# Patient Record
Sex: FEMALE | Race: WHITE | NOT HISPANIC OR LATINO | Employment: UNEMPLOYED | URBAN - METROPOLITAN AREA
[De-identification: names, ages, dates, MRNs, and addresses within clinical notes are randomized per-mention and may not be internally consistent; named-entity substitution may affect disease eponyms.]

---

## 2024-06-05 ENCOUNTER — HOSPITAL ENCOUNTER (OUTPATIENT)
Facility: HOSPITAL | Age: 34
Discharge: HOME | End: 2024-06-05
Attending: OBSTETRICS & GYNECOLOGY | Admitting: OBSTETRICS & GYNECOLOGY
Payer: COMMERCIAL

## 2024-06-05 ENCOUNTER — HOSPITAL ENCOUNTER (OUTPATIENT)
Facility: HOSPITAL | Age: 34
Setting detail: OBSERVATION
End: 2024-06-05
Attending: OBSTETRICS & GYNECOLOGY | Admitting: OBSTETRICS & GYNECOLOGY
Payer: COMMERCIAL

## 2024-06-05 ENCOUNTER — HOSPITAL ENCOUNTER (EMERGENCY)
Facility: HOSPITAL | Age: 34
Discharge: ANOTHER HOSPITAL | End: 2024-06-05
Attending: EMERGENCY MEDICINE
Payer: COMMERCIAL

## 2024-06-05 ENCOUNTER — HOSPITAL ENCOUNTER (OUTPATIENT)
Facility: HOSPITAL | Age: 34
End: 2024-06-05
Attending: OBSTETRICS & GYNECOLOGY | Admitting: OBSTETRICS & GYNECOLOGY
Payer: COMMERCIAL

## 2024-06-05 VITALS
OXYGEN SATURATION: 98 % | WEIGHT: 127 LBS | DIASTOLIC BLOOD PRESSURE: 74 MMHG | SYSTOLIC BLOOD PRESSURE: 109 MMHG | HEIGHT: 63 IN | RESPIRATION RATE: 17 BRPM | HEART RATE: 89 BPM | TEMPERATURE: 96.6 F | BODY MASS INDEX: 22.5 KG/M2

## 2024-06-05 VITALS
RESPIRATION RATE: 20 BRPM | WEIGHT: 132.72 LBS | OXYGEN SATURATION: 100 % | HEART RATE: 92 BPM | SYSTOLIC BLOOD PRESSURE: 113 MMHG | TEMPERATURE: 97.6 F | BODY MASS INDEX: 23.52 KG/M2 | HEIGHT: 63 IN | DIASTOLIC BLOOD PRESSURE: 73 MMHG

## 2024-06-05 VITALS
OXYGEN SATURATION: 100 % | DIASTOLIC BLOOD PRESSURE: 72 MMHG | SYSTOLIC BLOOD PRESSURE: 127 MMHG | HEIGHT: 63 IN | WEIGHT: 132.72 LBS | TEMPERATURE: 98.2 F | BODY MASS INDEX: 23.52 KG/M2 | RESPIRATION RATE: 20 BRPM | HEART RATE: 89 BPM

## 2024-06-05 DIAGNOSIS — O47.03: Primary | ICD-10-CM

## 2024-06-05 LAB
ABO GROUP (TYPE) IN BLOOD: NORMAL
ALBUMIN SERPL BCP-MCNC: 3.4 G/DL (ref 3.4–5)
ALP SERPL-CCNC: 105 U/L (ref 33–110)
ALT SERPL W P-5'-P-CCNC: 10 U/L (ref 7–45)
ANION GAP SERPL CALC-SCNC: 11 MMOL/L (ref 10–20)
ANTIBODY SCREEN: NORMAL
APPEARANCE UR: CLEAR
AST SERPL W P-5'-P-CCNC: 16 U/L (ref 9–39)
BASOPHILS # BLD AUTO: 0.01 X10*3/UL (ref 0–0.1)
BASOPHILS NFR BLD AUTO: 0.1 %
BILIRUB SERPL-MCNC: 0.4 MG/DL (ref 0–1.2)
BILIRUB UR STRIP.AUTO-MCNC: NEGATIVE MG/DL
BUN SERPL-MCNC: 7 MG/DL (ref 6–23)
CALCIUM SERPL-MCNC: 8.5 MG/DL (ref 8.6–10.3)
CHLORIDE SERPL-SCNC: 108 MMOL/L (ref 98–107)
CO2 SERPL-SCNC: 22 MMOL/L (ref 21–32)
COLOR UR: ABNORMAL
CREAT SERPL-MCNC: 0.6 MG/DL (ref 0.5–1.05)
EGFRCR SERPLBLD CKD-EPI 2021: >90 ML/MIN/1.73M*2
EOSINOPHIL # BLD AUTO: 0.04 X10*3/UL (ref 0–0.7)
EOSINOPHIL NFR BLD AUTO: 0.4 %
ERYTHROCYTE [DISTWIDTH] IN BLOOD BY AUTOMATED COUNT: 13.3 % (ref 11.5–14.5)
GLUCOSE SERPL-MCNC: 131 MG/DL (ref 74–99)
GLUCOSE UR STRIP.AUTO-MCNC: ABNORMAL MG/DL
HCT VFR BLD AUTO: 30.6 % (ref 36–46)
HGB BLD-MCNC: 9.5 G/DL (ref 12–16)
HOLD SPECIMEN: NORMAL
IMM GRANULOCYTES # BLD AUTO: 0.03 X10*3/UL (ref 0–0.7)
IMM GRANULOCYTES NFR BLD AUTO: 0.3 % (ref 0–0.9)
KETONES UR STRIP.AUTO-MCNC: NEGATIVE MG/DL
LEUKOCYTE ESTERASE UR QL STRIP.AUTO: NEGATIVE
LYMPHOCYTES # BLD AUTO: 0.86 X10*3/UL (ref 1.2–4.8)
LYMPHOCYTES NFR BLD AUTO: 9.2 %
MAGNESIUM SERPL-MCNC: 1.79 MG/DL (ref 1.6–2.4)
MCH RBC QN AUTO: 26 PG (ref 26–34)
MCHC RBC AUTO-ENTMCNC: 31 G/DL (ref 32–36)
MCV RBC AUTO: 84 FL (ref 80–100)
MONOCYTES # BLD AUTO: 0.44 X10*3/UL (ref 0.1–1)
MONOCYTES NFR BLD AUTO: 4.7 %
NEUTROPHILS # BLD AUTO: 7.94 X10*3/UL (ref 1.2–7.7)
NEUTROPHILS NFR BLD AUTO: 85.3 %
NITRITE UR QL STRIP.AUTO: NEGATIVE
NRBC BLD-RTO: 0 /100 WBCS (ref 0–0)
PH UR STRIP.AUTO: 7 [PH]
PLATELET # BLD AUTO: 159 X10*3/UL (ref 150–450)
POTASSIUM SERPL-SCNC: 3.6 MMOL/L (ref 3.5–5.3)
PROT SERPL-MCNC: 6.2 G/DL (ref 6.4–8.2)
PROT UR STRIP.AUTO-MCNC: ABNORMAL MG/DL
RBC # BLD AUTO: 3.65 X10*6/UL (ref 4–5.2)
RBC # UR STRIP.AUTO: NEGATIVE /UL
RBC #/AREA URNS AUTO: NORMAL /HPF
RH FACTOR (ANTIGEN D): NORMAL
SODIUM SERPL-SCNC: 137 MMOL/L (ref 136–145)
SP GR UR STRIP.AUTO: 1.02
SQUAMOUS #/AREA URNS AUTO: NORMAL /HPF
UROBILINOGEN UR STRIP.AUTO-MCNC: NORMAL MG/DL
WBC # BLD AUTO: 9.3 X10*3/UL (ref 4.4–11.3)
WBC #/AREA URNS AUTO: NORMAL /HPF

## 2024-06-05 PROCEDURE — 84075 ASSAY ALKALINE PHOSPHATASE: CPT | Performed by: EMERGENCY MEDICINE

## 2024-06-05 PROCEDURE — 85025 COMPLETE CBC W/AUTO DIFF WBC: CPT | Performed by: EMERGENCY MEDICINE

## 2024-06-05 PROCEDURE — 99214 OFFICE O/P EST MOD 30 MIN: CPT

## 2024-06-05 PROCEDURE — 96360 HYDRATION IV INFUSION INIT: CPT

## 2024-06-05 PROCEDURE — 59025 FETAL NON-STRESS TEST: CPT | Performed by: OBSTETRICS & GYNECOLOGY

## 2024-06-05 PROCEDURE — 2500000004 HC RX 250 GENERAL PHARMACY W/ HCPCS (ALT 636 FOR OP/ED): Mod: SE | Performed by: EMERGENCY MEDICINE

## 2024-06-05 PROCEDURE — 36415 COLL VENOUS BLD VENIPUNCTURE: CPT | Performed by: EMERGENCY MEDICINE

## 2024-06-05 PROCEDURE — 99203 OFFICE O/P NEW LOW 30 MIN: CPT | Performed by: OBSTETRICS & GYNECOLOGY

## 2024-06-05 PROCEDURE — 99285 EMERGENCY DEPT VISIT HI MDM: CPT | Mod: 25

## 2024-06-05 PROCEDURE — 83735 ASSAY OF MAGNESIUM: CPT | Performed by: EMERGENCY MEDICINE

## 2024-06-05 PROCEDURE — 81001 URINALYSIS AUTO W/SCOPE: CPT | Performed by: EMERGENCY MEDICINE

## 2024-06-05 PROCEDURE — 86901 BLOOD TYPING SEROLOGIC RH(D): CPT | Performed by: EMERGENCY MEDICINE

## 2024-06-05 RX ORDER — PROGESTERONE 100 MG/1
CAPSULE ORAL DAILY
Status: ON HOLD | COMMUNITY
End: 2024-06-06

## 2024-06-05 RX ADMIN — SODIUM CHLORIDE 1000 ML: 9 INJECTION, SOLUTION INTRAVENOUS at 11:09

## 2024-06-05 ASSESSMENT — PAIN DESCRIPTION - ONSET: ONSET: AWAKENED FROM SLEEP

## 2024-06-05 ASSESSMENT — PAIN DESCRIPTION - FREQUENCY: FREQUENCY: CONSTANT/CONTINUOUS

## 2024-06-05 ASSESSMENT — PAIN SCALES - GENERAL
PAINLEVEL_OUTOF10: 7
PAINLEVEL_OUTOF10: 8
PAINLEVEL_OUTOF10: 6

## 2024-06-05 ASSESSMENT — PAIN DESCRIPTION - ORIENTATION: ORIENTATION: LOWER

## 2024-06-05 ASSESSMENT — PAIN DESCRIPTION - DESCRIPTORS: DESCRIPTORS: PRESSURE

## 2024-06-05 ASSESSMENT — PAIN DESCRIPTION - LOCATION: LOCATION: PELVIS

## 2024-06-05 ASSESSMENT — PAIN DESCRIPTION - PROGRESSION: CLINICAL_PROGRESSION: NOT CHANGED

## 2024-06-05 ASSESSMENT — COLUMBIA-SUICIDE SEVERITY RATING SCALE - C-SSRS
2. HAVE YOU ACTUALLY HAD ANY THOUGHTS OF KILLING YOURSELF?: NO
1. IN THE PAST MONTH, HAVE YOU WISHED YOU WERE DEAD OR WISHED YOU COULD GO TO SLEEP AND NOT WAKE UP?: NO
6. HAVE YOU EVER DONE ANYTHING, STARTED TO DO ANYTHING, OR PREPARED TO DO ANYTHING TO END YOUR LIFE?: NO

## 2024-06-05 ASSESSMENT — PAIN - FUNCTIONAL ASSESSMENT: PAIN_FUNCTIONAL_ASSESSMENT: 0-10

## 2024-06-05 ASSESSMENT — PAIN DESCRIPTION - PAIN TYPE: TYPE: ACUTE PAIN

## 2024-06-05 NOTE — DISCHARGE INSTRUCTIONS
Triage discharge instructions:      []Notify provider for contractions or cramps become more frequent than 3-4 times in an hour  []Notify provider for regular painful contractions every 5 minutes or less for one hour  []Notify provider for leaking of fluid or blood from your vagina  []Notify provider if your baby is not moving as much as usual

## 2024-06-05 NOTE — ED PROVIDER NOTES
HPI   Chief Complaint   Patient presents with    Contractions       HPI  Patient is a 34-year-old female, G8, P7, approximately 35 weeks pregnant, presenting to the ED today for contractions.  Patient currently follows with Dr. Edgar OB.  She explains that she was at [facility] this morning for contractions where they monitored her for about 1 hour and discharged her home.  Later in the day, she started having more frequent contractions so she went to Dr. Edgar's office.  From Dr. Lora's office, she was supposed to be sent to [facility] OB for further monitoring, but somehow ended up here at [facility] instead.  Patient arrived via EMS.  At this time, she currently complains of continued contractions, occurring about every 5 minutes.  She denies any large gush of fluid or vaginal discharge.  She notes good fetal movement. She has no additional concerns.  Denies any complications with her current pregnancy.  Previous ultrasounds have shown baby to be head down. All previous deliveries have been vaginal, sometime between 34 and 37 weeks.                  [place] Coma Scale Score: 15                     Patient History   No past medical history on file.  No past surgical history on file.  No family history on file.  Social History     Tobacco Use    Smoking status: Not on file    Smokeless tobacco: Not on file   Substance Use Topics    Alcohol use: Not on file    Drug use: Not on file       Physical Exam   ED Triage Vitals [06/05/24 1101]   Temperature Heart Rate Respirations BP   36.4 °C (97.6 °F) 99 18 113/73      Pulse Ox Temp Source Heart Rate Source Patient Position   97 % Temporal Monitor --      BP Location FiO2 (%)     -- --       Physical Exam  Vitals and nursing note reviewed.   Constitutional:       General: She is not in acute distress.     Appearance: She is not toxic-appearing.   HENT:      Head: Normocephalic.      Mouth/Throat:      Mouth: Mucous membranes are moist.   Eyes:      Extraocular Movements:  Extraocular movements intact.      Conjunctiva/sclera: Conjunctivae normal.   Cardiovascular:      Rate and Rhythm: Normal rate and regular rhythm.   Pulmonary:      Effort: Pulmonary effort is normal. No respiratory distress.      Breath sounds: Normal breath sounds. No wheezing.   Abdominal:      Comments: Gravid uterus   Genitourinary:     Comments: Performed with MARIELA Rodriguez. Cervix approximately 2 cm dilated, 40% effaced  Musculoskeletal:         General: No swelling.      Cervical back: Neck supple.   Skin:     General: Skin is warm and dry.      Capillary Refill: Capillary refill takes less than 2 seconds.   Neurological:      General: No focal deficit present.      Mental Status: She is alert. Mental status is at baseline.         ED Course & MDM   Diagnoses as of 06/05/24 1226   Premature uterine contractions in third trimester, antepartum ([facility])       Medical Decision Making  Patient was seen and evaluated for contractions in the setting of third trimester pregnancy.  Fetal heart tones were obtained, measuring 125 - 135.  On exam, patient's cervix is approximately 2 cm dilated.  Discussed the case with Dr. Edgar, patient's OB.  He agrees that patient was supposed to be sent to [facility] OB triage for further monitoring, and accepts patient for transfer.  Patient is transferred to [facility] for further management.    Procedure  Procedures        Cristin YOUNG MD  06/05/24 1226

## 2024-06-05 NOTE — H&P
Obstetrical Admission History and Physical     This patient is a 33 yo  with complaints of contractions.  She denies any VB or LOF and has good FM.     Obstetrical History   OB History    Para Term  AB Living   8 7 3 4   7   SAB IAB Ectopic Multiple Live Births           7      # Outcome Date GA Lbr Jayden/2nd Weight Sex Delivery Anes PTL Lv   8 Current            7 Term 21 37w0d   F Vag-Spont None  MERLY   6  19 34w0d   M Vag-Spont EPI  MERLY      Complications: Abruptio Placenta   5  18 36w0d   M Vag-Spont None  MERLY   4 Term 16 37w0d   M Vag-Spont EPI  MERLY      Birth Comments: kept and induced per patient for decreased fetal movement   3 Term 13 37w0d   M Vag-Spont None  MERLY   2  11    F Vag-Spont None Y MERLY      Birth Comments: went into labor at 5 months, stopped and observed, delivered between 35-36 weeks   1  02/22/10 36w0d   F Vag-Spont EPI  MERLY       Past Medical History  No past medical history on file.     Past Surgical History   No past surgical history on file.    Social History  Social History     Tobacco Use    Smoking status: Not on file    Smokeless tobacco: Not on file   Substance Use Topics    Alcohol use: Not on file     Substance and Sexual Activity   Drug Use Not on file       Allergies  Patient has no known allergies.     Medications  Medications Prior to Admission   Medication Sig Dispense Refill Last Dose    progesterone (Prometrium) 100 mg capsule Insert into the vagina once daily.          Objective    Last Vitals  Temp Pulse Resp BP MAP O2 Sat   (!) 35.9 °C (96.6 °F) 89 17 109/74   98 %     Physical Examination  Physical Examination  GENERAL: Examination reveals a well developed, well nourished, gravid female in no acute distress. She is alert and cooperative.  ABD-gravid, nontender  CERVIX   1-2/60/-1   (no change in 1-2 hours)  EFM   Cat 1 tracing  CONTRACTIONS   irregular  EXT   NT     Lab Review  Labs in  chart were reviewed.    Assessment/Plan:  -labor precautions

## 2024-06-05 NOTE — ED TRIAGE NOTES
Pt arrived to ED via EMS from her OB appt. , all vaginal deliveries. Complaints of contractions q5 min. Denies leaking of fluid or vaginal bleeding. Positive fetal movement. Cervical exam /-3.  with moderate variability. Has history of pre-term deliveries.     1140: , accelerations present. +fetal movement.

## 2024-06-05 NOTE — H&P
Obstetrical Admission History and Physical     Annmarie Jones is a 34 y.o. . At 35+6 presents via squad transfer from [place] due to pelvic pressure.     Chief Complaint: Contractions and Leakage/Loss of Fluid    Assessment/Plan    D/w pt no s/sx of active labor.  No cervical change from prior exam.   Recommend discharge home.  Precautions d/w pt in detail and pt aware to call her primary OB if any change in status    Active Problems:  There are no active Hospital Problems.      Pregnancy Problems (from 24 to present)       No problems associated with this episode.          Subjective   Annmarie is here complaining of pelvic pressure for a few days now.   Cramping at times.  Good FM, no VB.  Pt was seen this am in our triage area and discharged home after no cervical change.  Pt was seen in her primary OB providers office and taken by EMS to [facility] then back to [facility] for repeat evaluation     Obstetrical History   OB History    Para Term  AB Living   8 7 3 4   7   SAB IAB Ectopic Multiple Live Births           7      # Outcome Date GA Lbr Jayden/2nd Weight Sex Delivery Anes PTL Lv   8 Current            7 Term 21 37w0d   F Vag-Spont None  MERLY   6  19 34w0d   M Vag-Spont EPI  MERLY      Complications: Abruptio Placenta   5  18 36w0d   M Vag-Spont None  MERLY   4 Term 16 37w0d   M Vag-Spont EPI  MERLY      Birth Comments: kept and induced per patient for decreased fetal movement   3 Term 13 37w0d   M Vag-Spont None  MERLY   2  11    F Vag-Spont None Y MERLY      Birth Comments: went into labor at 5 months, stopped and observed, delivered between 35-36 weeks   1  02/22/10 36w0d   F Vag-Spont EPI  MERLY       Past Medical History  No past medical history on file.     Past Surgical History   No past surgical history on file.    Social History  Social History     Tobacco Use    Smoking status: Never    Smokeless tobacco: Never   Substance  Use Topics    Alcohol use: Never     Substance and Sexual Activity   Drug Use Never       Allergies  Patient has no known allergies.     Medications  Medications Prior to Admission   Medication Sig Dispense Refill Last Dose    progesterone (Prometrium) 100 mg capsule Insert into the vagina once daily.   Past Month       Objective    Last Vitals  Temp Pulse Resp BP MAP O2 Sat   36.8 °C (98.2 °F) 89 20 127/72   100 %     Physical Examination  GENERAL: Examination reveals a well developed, well nourished, gravid female in no acute distress. She is alert and cooperative.  ABDOMEN: soft, gravid, nontender, nondistended, no abnormal masses, no epigastric pain  FHR is reactive    [place] reading:  irritability  CERVIX:  1-2/50/-2 ; MEMBRANES are    EXTREMITIES: no redness or tenderness in the calves or thighs, no edema    Lab Review  Labs in chart were reviewed.

## 2024-06-06 ENCOUNTER — HOSPITAL ENCOUNTER (INPATIENT)
Facility: HOSPITAL | Age: 34
LOS: 1 days | Discharge: HOME | End: 2024-06-07
Attending: OBSTETRICS & GYNECOLOGY | Admitting: OBSTETRICS & GYNECOLOGY
Payer: COMMERCIAL

## 2024-06-06 PROBLEM — O60.00 ACTIVE PRETERM LABOR (HHS-HCC): Status: ACTIVE | Noted: 2024-06-06

## 2024-06-06 LAB
ABO GROUP (TYPE) IN BLOOD: NORMAL
ANTIBODY SCREEN: NORMAL
ERYTHROCYTE [DISTWIDTH] IN BLOOD BY AUTOMATED COUNT: 13.4 % (ref 11.5–14.5)
HCT VFR BLD AUTO: 28.8 % (ref 36–46)
HGB BLD-MCNC: 9.3 G/DL (ref 12–16)
MCH RBC QN AUTO: 25.8 PG (ref 26–34)
MCHC RBC AUTO-ENTMCNC: 32.3 G/DL (ref 32–36)
MCV RBC AUTO: 80 FL (ref 80–100)
NRBC BLD-RTO: 0 /100 WBCS (ref 0–0)
PLATELET # BLD AUTO: 145 X10*3/UL (ref 150–450)
RBC # BLD AUTO: 3.6 X10*6/UL (ref 4–5.2)
RH FACTOR (ANTIGEN D): NORMAL
WBC # BLD AUTO: 7.8 X10*3/UL (ref 4.4–11.3)

## 2024-06-06 PROCEDURE — 85027 COMPLETE CBC AUTOMATED: CPT | Performed by: OBSTETRICS & GYNECOLOGY

## 2024-06-06 PROCEDURE — 36415 COLL VENOUS BLD VENIPUNCTURE: CPT | Performed by: OBSTETRICS & GYNECOLOGY

## 2024-06-06 PROCEDURE — 1220000001 HC OB SEMI-PRIVATE ROOM DAILY

## 2024-06-06 PROCEDURE — 99418 PROLNG IP/OBS E/M EA 15 MIN: CPT | Performed by: OBSTETRICS & GYNECOLOGY

## 2024-06-06 PROCEDURE — 86850 RBC ANTIBODY SCREEN: CPT | Performed by: OBSTETRICS & GYNECOLOGY

## 2024-06-06 PROCEDURE — 10907ZC DRAINAGE OF AMNIOTIC FLUID, THERAPEUTIC FROM PRODUCTS OF CONCEPTION, VIA NATURAL OR ARTIFICIAL OPENING: ICD-10-PCS | Performed by: OBSTETRICS & GYNECOLOGY

## 2024-06-06 PROCEDURE — 7210000002 HC LABOR PER HOUR

## 2024-06-06 PROCEDURE — 2500000004 HC RX 250 GENERAL PHARMACY W/ HCPCS (ALT 636 FOR OP/ED): Performed by: OBSTETRICS & GYNECOLOGY

## 2024-06-06 RX ORDER — LABETALOL HYDROCHLORIDE 5 MG/ML
20 INJECTION, SOLUTION INTRAVENOUS ONCE AS NEEDED
Status: DISCONTINUED | OUTPATIENT
Start: 2024-06-06 | End: 2024-06-07 | Stop reason: HOSPADM

## 2024-06-06 RX ORDER — NIFEDIPINE 10 MG/1
10 CAPSULE ORAL ONCE AS NEEDED
Status: DISCONTINUED | OUTPATIENT
Start: 2024-06-06 | End: 2024-06-07 | Stop reason: HOSPADM

## 2024-06-06 RX ORDER — FAMOTIDINE 20 MG/1
20 TABLET, FILM COATED ORAL 2 TIMES DAILY
Status: DISCONTINUED | OUTPATIENT
Start: 2024-06-06 | End: 2024-06-07 | Stop reason: HOSPADM

## 2024-06-06 RX ORDER — ONDANSETRON 4 MG/1
4 TABLET, FILM COATED ORAL EVERY 6 HOURS PRN
Status: DISCONTINUED | OUTPATIENT
Start: 2024-06-06 | End: 2024-06-07 | Stop reason: HOSPADM

## 2024-06-06 RX ORDER — OXYTOCIN/0.9 % SODIUM CHLORIDE 30/500 ML
2-30 PLASTIC BAG, INJECTION (ML) INTRAVENOUS CONTINUOUS
Status: DISCONTINUED | OUTPATIENT
Start: 2024-06-07 | End: 2024-06-07 | Stop reason: HOSPADM

## 2024-06-06 RX ORDER — METOCLOPRAMIDE 10 MG/1
10 TABLET ORAL EVERY 6 HOURS PRN
Status: DISCONTINUED | OUTPATIENT
Start: 2024-06-06 | End: 2024-06-07 | Stop reason: HOSPADM

## 2024-06-06 RX ORDER — CARBOPROST TROMETHAMINE 250 UG/ML
250 INJECTION, SOLUTION INTRAMUSCULAR ONCE AS NEEDED
Status: DISCONTINUED | OUTPATIENT
Start: 2024-06-06 | End: 2024-06-07 | Stop reason: HOSPADM

## 2024-06-06 RX ORDER — FAMOTIDINE 20 MG/1
20 TABLET, FILM COATED ORAL DAILY
Status: DISCONTINUED | OUTPATIENT
Start: 2024-06-07 | End: 2024-06-06

## 2024-06-06 RX ORDER — METOCLOPRAMIDE HYDROCHLORIDE 5 MG/ML
10 INJECTION INTRAMUSCULAR; INTRAVENOUS EVERY 6 HOURS PRN
Status: DISCONTINUED | OUTPATIENT
Start: 2024-06-06 | End: 2024-06-07 | Stop reason: HOSPADM

## 2024-06-06 RX ORDER — OXYTOCIN/0.9 % SODIUM CHLORIDE 30/500 ML
60 PLASTIC BAG, INJECTION (ML) INTRAVENOUS
Status: DISCONTINUED | OUTPATIENT
Start: 2024-06-06 | End: 2024-06-07 | Stop reason: HOSPADM

## 2024-06-06 RX ORDER — LOPERAMIDE HYDROCHLORIDE 2 MG/1
4 CAPSULE ORAL EVERY 2 HOUR PRN
Status: DISCONTINUED | OUTPATIENT
Start: 2024-06-06 | End: 2024-06-07 | Stop reason: HOSPADM

## 2024-06-06 RX ORDER — MISOPROSTOL 200 UG/1
800 TABLET ORAL ONCE AS NEEDED
Status: DISCONTINUED | OUTPATIENT
Start: 2024-06-06 | End: 2024-06-07 | Stop reason: HOSPADM

## 2024-06-06 RX ORDER — SODIUM CHLORIDE, SODIUM LACTATE, POTASSIUM CHLORIDE, CALCIUM CHLORIDE 600; 310; 30; 20 MG/100ML; MG/100ML; MG/100ML; MG/100ML
125 INJECTION, SOLUTION INTRAVENOUS CONTINUOUS
Status: DISCONTINUED | OUTPATIENT
Start: 2024-06-06 | End: 2024-06-07 | Stop reason: HOSPADM

## 2024-06-06 RX ORDER — HYDRALAZINE HYDROCHLORIDE 20 MG/ML
5 INJECTION INTRAMUSCULAR; INTRAVENOUS ONCE AS NEEDED
Status: DISCONTINUED | OUTPATIENT
Start: 2024-06-06 | End: 2024-06-07 | Stop reason: HOSPADM

## 2024-06-06 RX ORDER — FAMOTIDINE 10 MG/ML
20 INJECTION INTRAVENOUS DAILY
Status: DISCONTINUED | OUTPATIENT
Start: 2024-06-07 | End: 2024-06-06

## 2024-06-06 RX ORDER — METHYLERGONOVINE MALEATE 0.2 MG/ML
0.2 INJECTION INTRAVENOUS ONCE AS NEEDED
Status: DISCONTINUED | OUTPATIENT
Start: 2024-06-06 | End: 2024-06-07 | Stop reason: HOSPADM

## 2024-06-06 RX ORDER — ONDANSETRON HYDROCHLORIDE 2 MG/ML
4 INJECTION, SOLUTION INTRAVENOUS EVERY 6 HOURS PRN
Status: DISCONTINUED | OUTPATIENT
Start: 2024-06-06 | End: 2024-06-07 | Stop reason: HOSPADM

## 2024-06-06 RX ORDER — LIDOCAINE HYDROCHLORIDE 10 MG/ML
30 INJECTION INFILTRATION; PERINEURAL ONCE AS NEEDED
Status: DISCONTINUED | OUTPATIENT
Start: 2024-06-06 | End: 2024-06-07 | Stop reason: HOSPADM

## 2024-06-06 RX ORDER — OXYTOCIN 10 [USP'U]/ML
10 INJECTION, SOLUTION INTRAMUSCULAR; INTRAVENOUS ONCE AS NEEDED
Status: DISCONTINUED | OUTPATIENT
Start: 2024-06-06 | End: 2024-06-07 | Stop reason: HOSPADM

## 2024-06-06 RX ORDER — FAMOTIDINE 10 MG/ML
20 INJECTION INTRAVENOUS 2 TIMES DAILY
Status: DISCONTINUED | OUTPATIENT
Start: 2024-06-06 | End: 2024-06-07 | Stop reason: HOSPADM

## 2024-06-06 RX ORDER — TRANEXAMIC ACID 100 MG/ML
1000 INJECTION, SOLUTION INTRAVENOUS ONCE AS NEEDED
Status: DISCONTINUED | OUTPATIENT
Start: 2024-06-06 | End: 2024-06-07 | Stop reason: HOSPADM

## 2024-06-06 RX ORDER — TERBUTALINE SULFATE 1 MG/ML
0.25 INJECTION SUBCUTANEOUS ONCE AS NEEDED
Status: DISCONTINUED | OUTPATIENT
Start: 2024-06-06 | End: 2024-06-07 | Stop reason: HOSPADM

## 2024-06-06 RX ADMIN — FAMOTIDINE 20 MG: 10 INJECTION, SOLUTION INTRAVENOUS at 23:11

## 2024-06-06 RX ADMIN — SODIUM CHLORIDE, SODIUM LACTATE, POTASSIUM CHLORIDE, AND CALCIUM CHLORIDE 125 ML/HR: 600; 310; 30; 20 INJECTION, SOLUTION INTRAVENOUS at 21:45

## 2024-06-06 SDOH — ECONOMIC STABILITY: HOUSING INSECURITY: DO YOU FEEL UNSAFE GOING BACK TO THE PLACE WHERE YOU ARE LIVING?: NO

## 2024-06-06 SDOH — SOCIAL STABILITY: SOCIAL INSECURITY: ARE YOU OR HAVE YOU BEEN THREATENED OR ABUSED PHYSICALLY, EMOTIONALLY, OR SEXUALLY BY ANYONE?: YES

## 2024-06-06 SDOH — SOCIAL STABILITY: SOCIAL INSECURITY: DOES ANYONE TRY TO KEEP YOU FROM HAVING/CONTACTING OTHER FRIENDS OR DOING THINGS OUTSIDE YOUR HOME?: NO

## 2024-06-06 SDOH — SOCIAL STABILITY: SOCIAL INSECURITY: VERBAL ABUSE: YES, PAST (COMMENT)

## 2024-06-06 SDOH — HEALTH STABILITY: MENTAL HEALTH: STRENGTHS (MUST CHOOSE TWO): SUPPORT FROM FRIENDS;SUPPORT FROM FAMILY

## 2024-06-06 SDOH — SOCIAL STABILITY: SOCIAL INSECURITY: DO YOU FEEL ANYONE HAS EXPLOITED OR TAKEN ADVANTAGE OF YOU FINANCIALLY OR OF YOUR PERSONAL PROPERTY?: NO

## 2024-06-06 SDOH — SOCIAL STABILITY: SOCIAL INSECURITY: PHYSICAL ABUSE: YES, PAST (COMMENT)

## 2024-06-06 SDOH — SOCIAL STABILITY: SOCIAL INSECURITY: HAVE YOU HAD THOUGHTS OF HARMING ANYONE ELSE?: NO

## 2024-06-06 SDOH — HEALTH STABILITY: MENTAL HEALTH: NON-SPECIFIC ACTIVE SUICIDAL THOUGHTS (PAST 1 MONTH): NO

## 2024-06-06 SDOH — SOCIAL STABILITY: SOCIAL INSECURITY: ARE THERE ANY APPARENT SIGNS OF INJURIES/BEHAVIORS THAT COULD BE RELATED TO ABUSE/NEGLECT?: NO

## 2024-06-06 SDOH — SOCIAL STABILITY: SOCIAL INSECURITY: HAVE YOU HAD ANY THOUGHTS OF HARMING ANYONE ELSE?: NO

## 2024-06-06 SDOH — HEALTH STABILITY: MENTAL HEALTH: HAVE YOU USED ANY PRESCRIPTION DRUGS OTHER THAN PRESCRIBED IN THE PAST 12 MONTHS?: NO

## 2024-06-06 SDOH — HEALTH STABILITY: MENTAL HEALTH: WERE YOU ABLE TO COMPLETE ALL THE BEHAVIORAL HEALTH SCREENINGS?: YES

## 2024-06-06 SDOH — SOCIAL STABILITY: SOCIAL INSECURITY: ABUSE SCREEN: ADULT

## 2024-06-06 SDOH — HEALTH STABILITY: MENTAL HEALTH: HAVE YOU USED ANY SUBSTANCES (CANABIS, COCAINE, HEROIN, HALLUCINOGENS, INHALANTS, ETC.) IN THE PAST 12 MONTHS?: NO

## 2024-06-06 SDOH — HEALTH STABILITY: MENTAL HEALTH: SUICIDAL BEHAVIOR (LIFETIME): NO

## 2024-06-06 SDOH — HEALTH STABILITY: MENTAL HEALTH: WISH TO BE DEAD (PAST 1 MONTH): NO

## 2024-06-06 SDOH — SOCIAL STABILITY: SOCIAL INSECURITY: HAS ANYONE EVER THREATENED TO HURT YOUR FAMILY OR YOUR PETS?: NO

## 2024-06-06 ASSESSMENT — ACTIVITIES OF DAILY LIVING (ADL)
BATHING: INDEPENDENT
PATIENT'S MEMORY ADEQUATE TO SAFELY COMPLETE DAILY ACTIVITIES?: YES
JUDGMENT_ADEQUATE_SAFELY_COMPLETE_DAILY_ACTIVITIES: YES
ADEQUATE_TO_COMPLETE_ADL: YES
GROOMING: INDEPENDENT
HEARING - LEFT EAR: FUNCTIONAL
LACK_OF_TRANSPORTATION: NO
HEARING - RIGHT EAR: FUNCTIONAL
FEEDING YOURSELF: INDEPENDENT
WALKS IN HOME: INDEPENDENT
DRESSING YOURSELF: INDEPENDENT
TOILETING: INDEPENDENT

## 2024-06-06 ASSESSMENT — LIFESTYLE VARIABLES
SKIP TO QUESTIONS 9-10: 1
HOW MANY STANDARD DRINKS CONTAINING ALCOHOL DO YOU HAVE ON A TYPICAL DAY: PATIENT DOES NOT DRINK
HOW OFTEN DO YOU HAVE 6 OR MORE DRINKS ON ONE OCCASION: NEVER
AUDIT-C TOTAL SCORE: 0
HOW OFTEN DO YOU HAVE A DRINK CONTAINING ALCOHOL: NEVER
AUDIT-C TOTAL SCORE: 0

## 2024-06-06 ASSESSMENT — EDINBURGH POSTNATAL DEPRESSION SCALE (EPDS)
I HAVE BEEN ABLE TO LAUGH AND SEE THE FUNNY SIDE OF THINGS: AS MUCH AS I ALWAYS COULD
I HAVE BEEN ANXIOUS OR WORRIED FOR NO GOOD REASON: NO, NOT AT ALL
I HAVE LOOKED FORWARD WITH ENJOYMENT TO THINGS: AS MUCH AS I EVER DID
I HAVE BLAMED MYSELF UNNECESSARILY WHEN THINGS WENT WRONG: NOT VERY OFTEN
THE THOUGHT OF HARMING MYSELF HAS OCCURRED TO ME: NEVER
I HAVE FELT SCARED OR PANICKY FOR NO GOOD REASON: NO, NOT AT ALL
I HAVE BEEN SO UNHAPPY THAT I HAVE HAD DIFFICULTY SLEEPING: NOT VERY OFTEN
THINGS HAVE BEEN GETTING ON TOP OF ME: NO, I HAVE BEEN COPING AS WELL AS EVER
TOTAL SCORE: 2
I HAVE BEEN SO UNHAPPY THAT I HAVE BEEN CRYING: NO, NEVER
I HAVE FELT SAD OR MISERABLE: NO, NOT AT ALL

## 2024-06-06 ASSESSMENT — PATIENT HEALTH QUESTIONNAIRE - PHQ9
1. LITTLE INTEREST OR PLEASURE IN DOING THINGS: NOT AT ALL
SUM OF ALL RESPONSES TO PHQ9 QUESTIONS 1 & 2: 0
2. FEELING DOWN, DEPRESSED OR HOPELESS: NOT AT ALL

## 2024-06-06 ASSESSMENT — PAIN SCALES - GENERAL
PAINLEVEL_OUTOF10: 8
PAINLEVEL_OUTOF10: 3
PAINLEVEL: 8

## 2024-06-07 ENCOUNTER — ANESTHESIA EVENT (OUTPATIENT)
Dept: OBSTETRICS AND GYNECOLOGY | Facility: HOSPITAL | Age: 34
End: 2024-06-07
Payer: COMMERCIAL

## 2024-06-07 ENCOUNTER — ANESTHESIA (OUTPATIENT)
Dept: OBSTETRICS AND GYNECOLOGY | Facility: HOSPITAL | Age: 34
End: 2024-06-07
Payer: COMMERCIAL

## 2024-06-07 ENCOUNTER — ANESTHESIA EVENT (OUTPATIENT)
Dept: OBSTETRICS AND GYNECOLOGY | Facility: HOSPITAL | Age: 34
End: 2024-06-07

## 2024-06-07 ENCOUNTER — ANESTHESIA (OUTPATIENT)
Dept: OBSTETRICS AND GYNECOLOGY | Facility: HOSPITAL | Age: 34
End: 2024-06-07

## 2024-06-07 VITALS
WEIGHT: 132.28 LBS | HEIGHT: 63 IN | DIASTOLIC BLOOD PRESSURE: 74 MMHG | BODY MASS INDEX: 23.44 KG/M2 | TEMPERATURE: 97.3 F | SYSTOLIC BLOOD PRESSURE: 110 MMHG | RESPIRATION RATE: 16 BRPM | HEART RATE: 84 BPM | OXYGEN SATURATION: 100 %

## 2024-06-07 PROBLEM — O60.00 ACTIVE PRETERM LABOR (HHS-HCC): Status: RESOLVED | Noted: 2024-06-06 | Resolved: 2024-06-07

## 2024-06-07 LAB
ABO GROUP (TYPE) IN BLOOD: NORMAL
RH FACTOR (ANTIGEN D): NORMAL
TREPONEMA PALLIDUM IGG+IGM AB [PRESENCE] IN SERUM OR PLASMA BY IMMUNOASSAY: NONREACTIVE

## 2024-06-07 PROCEDURE — 51701 INSERT BLADDER CATHETER: CPT

## 2024-06-07 PROCEDURE — 2500000004 HC RX 250 GENERAL PHARMACY W/ HCPCS (ALT 636 FOR OP/ED): Performed by: OBSTETRICS & GYNECOLOGY

## 2024-06-07 PROCEDURE — 2500000004 HC RX 250 GENERAL PHARMACY W/ HCPCS (ALT 636 FOR OP/ED): Performed by: NURSE ANESTHETIST, CERTIFIED REGISTERED

## 2024-06-07 PROCEDURE — 59409 OBSTETRICAL CARE: CPT | Performed by: OBSTETRICS & GYNECOLOGY

## 2024-06-07 PROCEDURE — 2500000005 HC RX 250 GENERAL PHARMACY W/O HCPCS: Performed by: NURSE ANESTHETIST, CERTIFIED REGISTERED

## 2024-06-07 PROCEDURE — 7210000002 HC LABOR PER HOUR

## 2024-06-07 PROCEDURE — 88307 TISSUE EXAM BY PATHOLOGIST: CPT | Mod: TC,TRILAB,WESLAB | Performed by: OBSTETRICS & GYNECOLOGY

## 2024-06-07 PROCEDURE — 86780 TREPONEMA PALLIDUM: CPT | Mod: TRILAB,WESLAB | Performed by: OBSTETRICS & GYNECOLOGY

## 2024-06-07 PROCEDURE — 7100000016 HC LABOR RECOVERY PER HOUR

## 2024-06-07 RX ORDER — OXYTOCIN 10 [USP'U]/ML
10 INJECTION, SOLUTION INTRAMUSCULAR; INTRAVENOUS ONCE AS NEEDED
Status: CANCELLED | OUTPATIENT
Start: 2024-06-07

## 2024-06-07 RX ORDER — METHYLERGONOVINE MALEATE 0.2 MG/ML
0.2 INJECTION INTRAVENOUS ONCE AS NEEDED
Status: CANCELLED | OUTPATIENT
Start: 2024-06-07

## 2024-06-07 RX ORDER — BISACODYL 10 MG/1
10 SUPPOSITORY RECTAL DAILY PRN
Status: CANCELLED | OUTPATIENT
Start: 2024-06-07

## 2024-06-07 RX ORDER — HYDRALAZINE HYDROCHLORIDE 20 MG/ML
5 INJECTION INTRAMUSCULAR; INTRAVENOUS ONCE AS NEEDED
Status: CANCELLED | OUTPATIENT
Start: 2024-06-07

## 2024-06-07 RX ORDER — LIDOCAINE 560 MG/1
1 PATCH PERCUTANEOUS; TOPICAL; TRANSDERMAL
Status: CANCELLED | OUTPATIENT
Start: 2024-06-07

## 2024-06-07 RX ORDER — OXYTOCIN/0.9 % SODIUM CHLORIDE 30/500 ML
60 PLASTIC BAG, INJECTION (ML) INTRAVENOUS ONCE AS NEEDED
Status: CANCELLED | OUTPATIENT
Start: 2024-06-07

## 2024-06-07 RX ORDER — DIPHENHYDRAMINE HYDROCHLORIDE 50 MG/ML
25 INJECTION INTRAMUSCULAR; INTRAVENOUS EVERY 6 HOURS PRN
Status: CANCELLED | OUTPATIENT
Start: 2024-06-07

## 2024-06-07 RX ORDER — ACETAMINOPHEN 325 MG/1
975 TABLET ORAL EVERY 6 HOURS
Status: CANCELLED | OUTPATIENT
Start: 2024-06-07

## 2024-06-07 RX ORDER — LOPERAMIDE HYDROCHLORIDE 2 MG/1
4 CAPSULE ORAL EVERY 2 HOUR PRN
Status: CANCELLED | OUTPATIENT
Start: 2024-06-07

## 2024-06-07 RX ORDER — NIFEDIPINE 10 MG/1
10 CAPSULE ORAL ONCE AS NEEDED
Status: CANCELLED | OUTPATIENT
Start: 2024-06-07

## 2024-06-07 RX ORDER — LIDOCAINE HCL/EPINEPHRINE/PF 2%-1:200K
VIAL (ML) INJECTION AS NEEDED
Status: DISCONTINUED | OUTPATIENT
Start: 2024-06-07 | End: 2024-06-07

## 2024-06-07 RX ORDER — IBUPROFEN 600 MG/1
600 TABLET ORAL EVERY 6 HOURS
Status: CANCELLED | OUTPATIENT
Start: 2024-06-07

## 2024-06-07 RX ORDER — MISOPROSTOL 200 UG/1
800 TABLET ORAL ONCE AS NEEDED
Status: CANCELLED | OUTPATIENT
Start: 2024-06-07

## 2024-06-07 RX ORDER — VALACYCLOVIR HYDROCHLORIDE 500 MG/1
500 TABLET, FILM COATED ORAL EVERY 12 HOURS SCHEDULED
Status: CANCELLED | OUTPATIENT
Start: 2024-06-07

## 2024-06-07 RX ORDER — LABETALOL HYDROCHLORIDE 5 MG/ML
20 INJECTION, SOLUTION INTRAVENOUS ONCE AS NEEDED
Status: CANCELLED | OUTPATIENT
Start: 2024-06-07

## 2024-06-07 RX ORDER — TRANEXAMIC ACID 100 MG/ML
1000 INJECTION, SOLUTION INTRAVENOUS ONCE AS NEEDED
Status: CANCELLED | OUTPATIENT
Start: 2024-06-07

## 2024-06-07 RX ORDER — FENTANYL/ROPIVACAINE/NS/PF 2MCG/ML-.2
0-25 PLASTIC BAG, INJECTION (ML) INJECTION CONTINUOUS
Status: DISCONTINUED | OUTPATIENT
Start: 2024-06-07 | End: 2024-06-07 | Stop reason: HOSPADM

## 2024-06-07 RX ORDER — ONDANSETRON HYDROCHLORIDE 2 MG/ML
4 INJECTION, SOLUTION INTRAVENOUS EVERY 6 HOURS PRN
Status: CANCELLED | OUTPATIENT
Start: 2024-06-07

## 2024-06-07 RX ORDER — ADHESIVE BANDAGE
10 BANDAGE TOPICAL
Status: CANCELLED | OUTPATIENT
Start: 2024-06-07

## 2024-06-07 RX ORDER — ONDANSETRON 4 MG/1
4 TABLET, FILM COATED ORAL EVERY 6 HOURS PRN
Status: CANCELLED | OUTPATIENT
Start: 2024-06-07

## 2024-06-07 RX ORDER — CARBOPROST TROMETHAMINE 250 UG/ML
250 INJECTION, SOLUTION INTRAMUSCULAR ONCE AS NEEDED
Status: CANCELLED | OUTPATIENT
Start: 2024-06-07

## 2024-06-07 RX ORDER — DIPHENHYDRAMINE HCL 25 MG
25 TABLET ORAL EVERY 6 HOURS PRN
Status: CANCELLED | OUTPATIENT
Start: 2024-06-07

## 2024-06-07 RX ORDER — POLYETHYLENE GLYCOL 3350 17 G/17G
17 POWDER, FOR SOLUTION ORAL 2 TIMES DAILY PRN
Status: CANCELLED | OUTPATIENT
Start: 2024-06-07

## 2024-06-07 RX ORDER — SIMETHICONE 80 MG
80 TABLET,CHEWABLE ORAL 4 TIMES DAILY PRN
Status: CANCELLED | OUTPATIENT
Start: 2024-06-07

## 2024-06-07 RX ADMIN — LIDOCAINE HYDROCHLORIDE AND EPINEPHRINE 5 ML: 20; 5 INJECTION, SOLUTION EPIDURAL; INFILTRATION; INTRACAUDAL; PERINEURAL at 04:32

## 2024-06-07 RX ADMIN — Medication 5 ML: at 04:35

## 2024-06-07 RX ADMIN — Medication 2 MILLI-UNITS/MIN: at 01:54

## 2024-06-07 RX ADMIN — Medication 10 ML/HR: at 04:36

## 2024-06-07 SDOH — HEALTH STABILITY: MENTAL HEALTH: CURRENT SMOKER: 0

## 2024-06-07 ASSESSMENT — PAIN SCALES - GENERAL
PAINLEVEL_OUTOF10: 0 - NO PAIN

## 2024-06-07 NOTE — PROGRESS NOTES
Postpartum Progress Note    Assessment/Plan   Annmarie Jones is a 34 y.o., , who delivered at 36w1d gestation and is now postpartum day 0.    Nurse states Dr Edgar ok for me to evaluate patient for discharge since Baby is being transferred.    Options discussed with patient - transfer for discharge.  Patient prefers discharge.    OK for home  Follow up with Dr Edgar for BPS and postpartum visit  Patient requests no meds    Principal Problem:    Active  labor ([facility])    Pregnancy Problems (from 24 to present)       Problem Noted Resolved    Active  labor ([facility]) 2024 by Nicolasa Edgar MD No    Priority:  Medium            Hospital course: no complications  Vaginal Birth  Patient is currently breastfeedingThe patient's blood type is O POS. The baby's blood type is O POS . Rhogam is not indicated.    Subjective   Her pain is well controlled with current medications  She is passing flatus  She is ambulating well  She is tolerating a Regular diet  She reports no breast or nursing problems  She denies emotional concerns today   Her plan for contraception is none         Objective   Allergies:   Patient has no known allergies.         Last Vitals:  Temp Pulse Resp BP MAP Pulse Ox   36.7 °C (98.1 °F) 70 15 94/56   100 %     Vitals Min/Max Last 24 Hours:  Temp  Min: 36.2 °C (97.2 °F)  Max: 36.9 °C (98.4 °F)  Pulse  Min: 56  Max: 114  Resp  Min: 15  Max: 18  BP  Min: 90/53  Max: 125/73    Intake/Output:     Intake/Output Summary (Last 24 hours) at 2024 1424  Last data filed at 2024 0810  Gross per 24 hour   Intake 5 ml   Output 700 ml   Net -695 ml       Physical Exam:  General: Examination reveals a well developed, well nourished, female, in no acute distress. She is alert and cooperative.  Abdomen: soft, gravid, nontender, nondistended, no abnormal masses, no epigastric pain.  Fundus: firm, below umbilicus, and nontender.  Perineum: minimal bleeding.  Extremities: no redness  or tenderness in the calves or thighs, no edema.    Lab Data:

## 2024-06-07 NOTE — ANESTHESIA PREPROCEDURE EVALUATION
Patient: Annmarie Jones    Evaluation Method: In-person visit    Procedure Information    Date: 06/07/24  Procedure: Labor Consult         Relevant Problems   No relevant active problems       Clinical information reviewed:   Tobacco  Allergies  Meds   Med Hx  Surg Hx   Fam Hx  Soc Hx        NPO Detail:  NPO/Void Status  Date of Last Liquid: 06/07/24  Time of Last Liquid: 1800  Date of Last Solid: 06/06/24  Time of Last Solid: 0900         OB/Gyn Evaluation    Present Pregnancy    Patient is pregnant now.   Obstetric History                Physical Exam    Airway  Mallampati: II     Cardiovascular - normal exam  Rhythm: regular     Dental - normal exam     Pulmonary - normal exam     Abdominal - normal exam             Anesthesia Plan    History of general anesthesia?: yes  History of complications of general anesthesia?: no    ASA 2     epidural     The patient is not a current smoker.  Patient was not previously instructed to abstain from smoking on day of procedure.  Patient did not smoke on day of procedure.    Anesthetic plan and risks discussed with patient.

## 2024-06-07 NOTE — L&D DELIVERY NOTE
OB Delivery Note  2024  Annmarie Jones  34 y.o.   Vaginal, Spontaneous        Vaginal Birth:                          Indication for Assisted/operative delivery: none                         Presentation: Vertex, Yes-                                             Breech, No-                         Position IAIN                        Amniotic Fluid: Clear                        Amniofusion for none                        Episiotomy: no episiotomy                                             Patient was positioned in lithotomy position.Head was delivered over an intact perineum. No nuchal cord. The anterior shoulder was delivered atraumatically. Followed by the posterior shoulder and fetal body. The cord was clamped and cut*2.   The infant was handed off to the awaiting mom. IV oxytocin were given.  The placenta was delivered spontaneously. She had no  laceration . MI was negative                          Anesthesia/Analgesia: epidural                       Placenta: Spontaneous, Yes-                                     Expressed, Yes-                                     Gross Exam WNL, Yes-                                     Culture Done, No-                                     Pathology exam indicated, No-                      Estimated Blood Loss: 200 cc                             Sponge and needle count correct: Yes                     Complications: none                         Gestational Age: 36w1d  /Para:   Quantitative Blood Loss: Admission to Discharge: 0 mL (2024  6:38 PM - 2024  6:32 AM)    Angelic Jones [[id number]]      Labor Events    Rupture date/time: 2024 2241  Rupture type: Artificial  Fluid color: Clear  Labor type: Spontaneous Onset of Labor  Labor allowed to proceed with plans for an attempted vaginal birth?: Yes  Complications: None       Placenta    Placenta delivery date/time:   Placenta removal: Spontaneous       Cord    Vessels: 3 vessels  Complications:  None  Delayed cord clamping?: Yes  Cord blood disposition: Discarded       Lacerations    Episiotomy: None  Perineal laceration: None       Anesthesia    Method: Epidural        Delivery    Birth date/time: 2024 06:20:00  Delivery type: Vaginal, Spontaneous  Complications: None       Apgars    Living status:   Apgar Component Scores:  1 min.:  5 min.:  10 min.:  15 min.:  20 min.:    Skin color:         Heart rate:         Reflex irritability:         Muscle tone:         Respiratory effort:         Total:                Delivery Providers    Delivering clinician: Nicolasa Edgar MD   Provider Role     Delivery Nurse     Nursery Nurse     Resident                 Nicolasa Edgar MD

## 2024-06-07 NOTE — L&D DELIVERY NOTE
OB Delivery Note  2024  Annmarie Jones  34 y.o.   Vaginal, Spontaneous        Gestational Age: 36w1d  /Para:   Quantitative Blood Loss: Admission to Discharge: 0 mL (2024  6:38 PM - 2024  6:32 AM)    Angelic Jones [[id number]]      Labor Events    Rupture date/time: 2024 2241  Rupture type: Artificial  Fluid color: Clear  Labor type: Spontaneous Onset of Labor  Labor allowed to proceed with plans for an attempted vaginal birth?: Yes  Complications: None       Placenta    Placenta delivery date/time:   Placenta removal: Spontaneous       Cord    Vessels: 3 vessels  Complications: None  Delayed cord clamping?: Yes  Cord blood disposition: Discarded       Lacerations    Episiotomy: None  Perineal laceration: None       Anesthesia    Method: Epidural        Delivery    Birth date/time: 2024 06:20:00  Delivery type: Vaginal, Spontaneous  Complications: None       Apgars    Living status:   Apgar Component Scores:  1 min.:  5 min.:  10 min.:  15 min.:  20 min.:    Skin color:         Heart rate:         Reflex irritability:         Muscle tone:         Respiratory effort:         Total:                Delivery Providers    Delivering clinician: Nicolasa Edgar MD   Provider Role     Delivery Nurse     Nursery Nurse     Resident                 Nicolasa Edgar MD

## 2024-06-07 NOTE — CARE PLAN
The patient's goals for the shift include discharge      The clinical goals for the shift include admission, monitor mother and baby during labor      Problem: Postpartum  Goal: Experiences normal postpartum course  Outcome: Met  Goal: Appropriate maternal -  bonding  Outcome: Met  Goal: Establish and maintain infant feeding pattern for adequate nutrition  Outcome: Met  Goal: Incisions, wounds, or drain sites healing without S/S of infection  Outcome: Met  Goal: No s/sx infection  Outcome: Met  Goal: No s/sx of hemorrhage  Outcome: Met  Goal: Minimal s/sx of HDP and BP<160/110  Outcome: Met     Problem: Pain - Adult  Goal: Verbalizes/displays adequate comfort level or baseline comfort level  Outcome: Met     Problem: Safety - Adult  Goal: Free from fall injury  Outcome: Met     Problem: Discharge Planning  Goal: Discharge to home or other facility with appropriate resources  Outcome: Met

## 2024-06-07 NOTE — PROGRESS NOTES
Intrapartum Progress Note    Assessment/Plan   Annmarie Jones is a 34 y.o.  at 36w0d. JENNIFER: 2024, by Interfaced JENNIFER. Active labor     AROM: clear fluid  Continue monitoring          Principal Problem:    Active  labor ([facility])    Pregnancy Problems (from 24 to present)       Problem Noted Resolved    Active  labor ([facility]) 2024 by Nicolasa Edgar MD No    Priority:  Medium              Subjective   Contraction q 3-5 min  No bleeding  No Pelvic pain   +FM  No ROM   Feeling more pressure     Objective   Last Vitals:  Temp Pulse Resp BP MAP Pulse Ox   36.9 °C (98.4 °F) 86 18 117/75   99 %     Vitals Min/Max Last 24 Hours:  Temp  Min: 36.7 °C (98.1 °F)  Max: 36.9 °C (98.4 °F)  Pulse  Min: 76  Max: 103  Resp  Min: 18  Max: 18  BP  Min: 111/71  Max: 117/75    Intake/Output:  No intake or output data in the 24 hours ending 24 0314    Physical Examination:  GENERAL: Examination reveals a well developed, well nourished, gravid female in no acute distress. She is alert and cooperative.  HEENT: PERRLA. External ears normal. Nose normal, no erythema or discharge. Mouth and throat clear.  [place] reading: Cat 1 tracing, +ve acceleration , no deceleration   Abdomen: Gravid, soft, non tender   The fetus is in a vertex presentation, determined by vaginal exam  VAGINA: normal appearing vagina with normal color and discharge and no lesions noted  CERVIX: 4 cm 70 % ; AROM after verbal consent: clear fluid     Lab Review:  Labs in chart were reviewed.

## 2024-06-07 NOTE — CARE PLAN
The patient's goals for the shift include Discharge    The clinical goals for the shift include Discarge    Problem: Postpartum  Goal: Experiences normal postpartum course  Outcome: Met  Goal: Appropriate maternal -  bonding  Outcome: Met  Goal: Establish and maintain infant feeding pattern for adequate nutrition  Outcome: Met  Goal: Incisions, wounds, or drain sites healing without S/S of infection  Outcome: Met  Goal: No s/sx infection  Outcome: Met  Goal: No s/sx of hemorrhage  Outcome: Met  Goal: Minimal s/sx of HDP and BP<160/110  Outcome: Met     Problem: Pain - Adult  Goal: Verbalizes/displays adequate comfort level or baseline comfort level  Outcome: Met     Problem: Safety - Adult  Goal: Free from fall injury  Outcome: Met     Problem: Discharge Planning  Goal: Discharge to home or other facility with appropriate resources  Outcome: Met

## 2024-06-07 NOTE — DISCHARGE SUMMARY
Discharge Summary    Admission Date: 2024  Discharge Date: 2024    Discharge Diagnosis  Active  labor ([facility])    Hospital Course  Delivery Date: 2024  6:20 AM   Delivery type: Vaginal, Spontaneous    GA at delivery: 36w1d  Outcome: Living   Anesthesia during delivery: Epidural   Intrapartum complications: None   Feeding method: Breastfeeding Status: Yes     Procedures:    Contraception at discharge: none      Pertinent Physical Exam At Time of Discharge        Discharge Meds     Your medication list      You have not been prescribed any medications.          Complications Requiring Follow-Up  none    Test Results Pending At Discharge  Pending Labs       Order Current Status    Surgical Pathology Exam - PLACENTA Collected (24 1420)    Syphilis Screen with Reflex In process            Outpatient Follow-Up  No future appointments.    I spent  minutes in the professional and overall care of this patient.      Shellie Correia MD

## 2024-06-07 NOTE — ANESTHESIA PROCEDURE NOTES
Epidural Block    Patient location during procedure: OB  Start time: 6/7/2024 4:20 AM  End time: 6/7/2024 4:35 AM  Reason for block: labor analgesia  Staffing  Performed: CRNA   Authorized by: DAISY Castro DNP    Performed by: DAISY Castro DNP    Preanesthetic Checklist  Completed: patient identified, IV checked, risks and benefits discussed, surgical consent, pre-op evaluation, timeout performed and sterile techniques followed  Block Timeout  RN/Licensed healthcare professional reads aloud to the Anesthesia provider and entire team: Patient identity, procedure with side and site, patient position, and as applicable the availability of implants/special equipment/special requirements.  Patient on coagulant treatment: no  Timeout performed at: 6/7/2024 4:20 AM  Block Placement  Patient position: sitting  Prep: Betadine  Sterility prep: cap, drape, hand, mask and gloves  Sedation level: no sedation  Patient monitoring: blood pressure, continuous pulse oximetry and heart rate  Approach: midline  Local numbing: lidocaine 1% to skin and subcutaneous tissues  Vertebral space: lumbar  Lumbar location: L3-L4  Epidural  Loss of resistance technique: saline  Guidance: landmark technique        Needle  Needle type: Tuohy   Needle gauge: 19  Needle length: 8.9cm  Needle insertion depth: 5 cm  Catheter type: end hole  Catheter size: 19 G  Catheter at skin depth: 9 cm  Catheter securement method: clear occlusive dressing, liquid medical adhesive and surgical tape    Test dose: lidocaine 1.5% with epinephrine 1-to-200,000  Test dose: lidocaine 1.5% with epinephrine 1-to-200,000  Test dose result: no positive test dose    PCEA  Medication concentration used: 0.2% Ropivacaine with 2 mcg/mL Fentanyl  Dose (mL): 5  Lockout (minutes): 20  1-Hour Limit (boluses/hr): 3  Basal Rate: 10        Assessment  Sensory level: T10 bilateral  Block outcome: pain improved  Number of attempts: 1  Events: no positive  test dose  Procedure assessment: patient tolerated procedure well with no immediate complications

## 2024-06-07 NOTE — NURSING NOTE
Dr. Edgar at bedside performing SVE. Exam is 3cm/50%/-1. Nitrazine test performed, results are negative. Dr. Edgar explains plan of care with patient. Dr. Edgar plans to recheck cervix in one hour and directed patient to hydrate with water while here. Patient verbalizes understanding.

## 2024-06-07 NOTE — H&P
Annmarie Jones is  34 y.o. female, , who is at 36w0d with an JENNIFER of 2024, by Interfaced JENNIFER dating method.    She presented complaining of q3 min contractions.  Good fetal movement.  Denies vaginal bleeding. No Rupture of membranes     No headache, no visual changes and No right upper abdominal pain      Obstetrical History:  OB History          8    Para   7    Term   3       4    AB        Living   7         SAB        IAB        Ectopic        Multiple        Live Births   7                       History reviewed. No pertinent past medical history.  History reviewed. No pertinent surgical history.  No family history on file.  Social History     Tobacco Use    Smoking status: Never    Smokeless tobacco: Never   Substance Use Topics    Alcohol use: Never    Drug use: Never     Social History     Tobacco Use   Smoking Status Never   Smokeless Tobacco Never       Allergies  Patient has no known allergies.     Medications  No medications prior to admission.           REVIEW OF SYSTEMS:    General: No weight loss, malaise or fevers or other constitutional symptoms.  Neuro: No history of TIA's, stroke,.  No neurological symptoms or problems. No visual changes  Respiratory: No history of respiratory/pulmonary symptoms or problems.  Cardiovascular: No history of cardiovascular symptoms or problems.  GI: No history of GI symptoms or problems.   : No history of UTI in past 6 weeks.  No history of  symptoms or problems.    PHYSICAL EXAMINATION:    Objective    Last Vitals  Temp Pulse Resp BP MAP O2 Sat   36.7 °C (98.1 °F) 89 18 111/71   100 %       GENERAL: pleasant, female in no apparent distress    ABDOMEN: soft, non-tender and no masses  PELVIC:  Cervix : 4 cm 60 % -1  NST : Cat 1 tracing , +ve acceleration, no deceleration       Lab Review  Hemoglobin   Date Value Ref Range Status   2024 9.5 (L) 12.0 - 16.0 g/dL Final     WBC   Date Value Ref Range Status   2024 9.3 4.4 - 11.3  x10*3/uL Final     Platelets   Date Value Ref Range Status   06/05/2024 159 150 - 450 x10*3/uL Final         Assessment and Plan  Annmarie Jones is  34 y.o. female,  who is at 36w0d presented with contraction and change in cervix   Her cervix was 2 cm yesterday       Plan:       Admit to L& D   No GBS prophylaxis  Epidural PRN  Oxytocin if there is no change in cervix  Continuous fetal monitoring      Nicolasa Edgar MD

## 2024-06-07 NOTE — ANESTHESIA POSTPROCEDURE EVALUATION
Patient: Annmarie Jones    Procedure Summary       Date: 06/07/24 Room / Location:     Anesthesia Start: 0420 Anesthesia Stop: 0620    Procedure: Labor Analgesia Diagnosis:     Scheduled Providers:  Responsible Provider: DAISY Castro, NORMA    Anesthesia Type: epidural ASA Status: 2            Anesthesia Type: epidural    Vitals Value Taken Time   BP 90/53 06/07/24 0639   Temp  06/07/24 0649   Pulse 70 06/07/24 0639   Resp 18 06/07/24 0649   SpO2  06/07/24 0649       Anesthesia Post Evaluation    Patient location during evaluation: bedside  Patient participation: complete - patient participated  Level of consciousness: awake and alert  Pain management: adequate  Airway patency: patent  Cardiovascular status: acceptable  Respiratory status: acceptable  Hydration status: acceptable  Postoperative Nausea and Vomiting: none        There were no known notable events for this encounter.
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
## 2024-06-10 ENCOUNTER — LACTATION ENCOUNTER (OUTPATIENT)
Dept: OTHER | Facility: HOSPITAL | Age: 34
End: 2024-06-10

## 2024-06-10 NOTE — LACTATION NOTE
"This note was copied from a baby's chart.  Lactation Consultant Note  Lactation Consultation   Maternal-Infant separation r/t NICU admission.    Maternal Information   Mom reports she has  her 7 older children, including some that were preamature infants.  Breast Pump   Mom reports she has always gotten her breast pumps thru her WIC and that is her plannow. She declined my offer to order an electric breast pump thru her her insurer for her.   Recommendations/Summary  Met with Mom at patient bedside. Explained availability of [facility] LC services. Instructed on listed patient education, YASH, Benefits of mother's own milk for  infant, breast massage & hand expression, CDC pump cleaning & sanitizing guidelines.  Invited to contact LC services as needed. Mom reports her milk \"came in\" this morning. She collected 65 ml with last pumping effort. Assisted with breastfeeding effort. Suggested mom position infant in football hold. Mom independently positioned Madelyn on her right breast. After  several efforts infant latched shallowly. Mom has a large nipple. Infant suckled for about 20-20 sucks several times but no audible swallowing noted. Mom easily able to express drops of milk for infant but she did not sustain suckling. Discussed strategies to arouse sleepy infant. Mom also has a bottle of her expressed milk she would like to feed. Discussed supportive bottle feeding techniques to encourage intake such as chin support.   Breastfeeding discharge information:  Lactation Center Information & [facility] Breastfeeding Hotline & resource numbers.  Baby should nurse a minimum of 8-12 times in 24 hours (every 2-3 hours). Wake a sleepy baby every 3 hours to feed, even during the night. The more often you nurse, the more milk your body will produce. Burp your baby when switching sides and at the end of a feeding. Expect 6-8 diapers per day by day 7 of age & 2-3 bowel movements per day. Mom reports she has made arrangements " to obtain breast pump & lactation support thru her wic after hospital discharge.

## 2024-06-12 LAB
LABORATORY COMMENT REPORT: NORMAL
PATH REPORT.FINAL DX SPEC: NORMAL
PATH REPORT.GROSS SPEC: NORMAL
PATH REPORT.RELEVANT HX SPEC: NORMAL
PATH REPORT.TOTAL CANCER: NORMAL